# Patient Record
Sex: FEMALE | Race: WHITE | NOT HISPANIC OR LATINO | URBAN - METROPOLITAN AREA
[De-identification: names, ages, dates, MRNs, and addresses within clinical notes are randomized per-mention and may not be internally consistent; named-entity substitution may affect disease eponyms.]

---

## 2017-07-31 ENCOUNTER — RECORDS - HEALTHEAST (OUTPATIENT)
Dept: LAB | Facility: CLINIC | Age: 27
End: 2017-07-31

## 2017-07-31 LAB
CHOLEST SERPL-MCNC: 224 MG/DL
FASTING STATUS PATIENT QL REPORTED: ABNORMAL
HDLC SERPL-MCNC: 53 MG/DL
LDLC SERPL CALC-MCNC: 138 MG/DL
TRIGL SERPL-MCNC: 167 MG/DL

## 2018-02-26 ENCOUNTER — RECORDS - HEALTHEAST (OUTPATIENT)
Dept: LAB | Facility: CLINIC | Age: 28
End: 2018-02-26

## 2018-02-28 LAB — BACTERIA SPEC CULT: NO GROWTH

## 2018-03-26 ENCOUNTER — RECORDS - HEALTHEAST (OUTPATIENT)
Dept: LAB | Facility: CLINIC | Age: 28
End: 2018-03-26

## 2018-03-27 LAB
ANION GAP SERPL CALCULATED.3IONS-SCNC: 12 MMOL/L (ref 5–18)
BUN SERPL-MCNC: 16 MG/DL (ref 8–22)
CALCIUM SERPL-MCNC: 9.8 MG/DL (ref 8.5–10.5)
CHLORIDE BLD-SCNC: 104 MMOL/L (ref 98–107)
CO2 SERPL-SCNC: 24 MMOL/L (ref 22–31)
CREAT SERPL-MCNC: 0.8 MG/DL (ref 0.6–1.1)
GFR SERPL CREATININE-BSD FRML MDRD: >60 ML/MIN/1.73M2
GLUCOSE BLD-MCNC: 84 MG/DL (ref 70–125)
POTASSIUM BLD-SCNC: 4 MMOL/L (ref 3.5–5)
SODIUM SERPL-SCNC: 140 MMOL/L (ref 136–145)
TSH SERPL DL<=0.005 MIU/L-ACNC: 1.03 UIU/ML (ref 0.3–5)

## 2018-03-28 LAB — 25(OH)D3 SERPL-MCNC: 9.9 NG/ML (ref 30–80)

## 2018-10-02 ENCOUNTER — RECORDS - HEALTHEAST (OUTPATIENT)
Dept: LAB | Facility: CLINIC | Age: 28
End: 2018-10-02

## 2018-10-03 LAB
ALBUMIN SERPL-MCNC: 4.3 G/DL (ref 3.5–5)
ALP SERPL-CCNC: 49 U/L (ref 45–120)
ALT SERPL W P-5'-P-CCNC: 12 U/L (ref 0–45)
ANION GAP SERPL CALCULATED.3IONS-SCNC: 11 MMOL/L (ref 5–18)
AST SERPL W P-5'-P-CCNC: 14 U/L (ref 0–40)
BILIRUB SERPL-MCNC: 0.9 MG/DL (ref 0–1)
BUN SERPL-MCNC: 16 MG/DL (ref 8–22)
CALCIUM SERPL-MCNC: 10.1 MG/DL (ref 8.5–10.5)
CHLORIDE BLD-SCNC: 102 MMOL/L (ref 98–107)
CHOLEST SERPL-MCNC: 256 MG/DL
CO2 SERPL-SCNC: 24 MMOL/L (ref 22–31)
CREAT SERPL-MCNC: 0.79 MG/DL (ref 0.6–1.1)
FASTING STATUS PATIENT QL REPORTED: ABNORMAL
GFR SERPL CREATININE-BSD FRML MDRD: >60 ML/MIN/1.73M2
GLUCOSE BLD-MCNC: 77 MG/DL (ref 70–125)
HDLC SERPL-MCNC: 55 MG/DL
LDLC SERPL CALC-MCNC: 162 MG/DL
POTASSIUM BLD-SCNC: 4.5 MMOL/L (ref 3.5–5)
PROT SERPL-MCNC: 8 G/DL (ref 6–8)
SODIUM SERPL-SCNC: 137 MMOL/L (ref 136–145)
TRIGL SERPL-MCNC: 194 MG/DL

## 2018-10-04 LAB — 25(OH)D3 SERPL-MCNC: 42.1 NG/ML (ref 30–80)

## 2019-08-08 ENCOUNTER — COMMUNICATION - HEALTHEAST (OUTPATIENT)
Dept: PALLIATIVE MEDICINE | Facility: OTHER | Age: 29
End: 2019-08-08

## 2019-09-04 ENCOUNTER — RECORDS - HEALTHEAST (OUTPATIENT)
Dept: LAB | Facility: CLINIC | Age: 29
End: 2019-09-04

## 2019-09-06 LAB
BACTERIA SPEC CULT: ABNORMAL
BACTERIA SPEC CULT: ABNORMAL

## 2019-10-07 ENCOUNTER — RECORDS - HEALTHEAST (OUTPATIENT)
Dept: LAB | Facility: CLINIC | Age: 29
End: 2019-10-07

## 2019-10-08 LAB
ANION GAP SERPL CALCULATED.3IONS-SCNC: 13 MMOL/L (ref 5–18)
BUN SERPL-MCNC: 14 MG/DL (ref 8–22)
CALCIUM SERPL-MCNC: 9.9 MG/DL (ref 8.5–10.5)
CHLORIDE BLD-SCNC: 102 MMOL/L (ref 98–107)
CHOLEST SERPL-MCNC: 240 MG/DL
CO2 SERPL-SCNC: 22 MMOL/L (ref 22–31)
CREAT SERPL-MCNC: 0.78 MG/DL (ref 0.6–1.1)
FASTING STATUS PATIENT QL REPORTED: ABNORMAL
GFR SERPL CREATININE-BSD FRML MDRD: >60 ML/MIN/1.73M2
GLUCOSE BLD-MCNC: 83 MG/DL (ref 70–125)
HDLC SERPL-MCNC: 49 MG/DL
LDLC SERPL CALC-MCNC: 140 MG/DL
POTASSIUM BLD-SCNC: 4.5 MMOL/L (ref 3.5–5)
SODIUM SERPL-SCNC: 137 MMOL/L (ref 136–145)
TRIGL SERPL-MCNC: 257 MG/DL

## 2019-10-09 LAB
25(OH)D3 SERPL-MCNC: 45.5 NG/ML (ref 30–80)
BKR LAB AP ABNORMAL BLEEDING: YES
BKR LAB AP BIRTH CONTROL/HORMONES: NORMAL
BKR LAB AP CERVICAL APPEARANCE: NORMAL
BKR LAB AP GYN ADEQUACY: NORMAL
BKR LAB AP GYN INTERPRETATION: NORMAL
BKR LAB AP HPV REFLEX: NORMAL
BKR LAB AP LMP: NORMAL
BKR LAB AP PATIENT STATUS: NORMAL
BKR LAB AP PREVIOUS ABNORMAL: NORMAL
BKR LAB AP PREVIOUS NORMAL: NORMAL
HIGH RISK?: NO
PATH REPORT.COMMENTS IMP SPEC: NORMAL
RESULT FLAG (HE HISTORICAL CONVERSION): NORMAL

## 2019-12-03 ENCOUNTER — RECORDS - HEALTHEAST (OUTPATIENT)
Dept: LAB | Facility: CLINIC | Age: 29
End: 2019-12-03

## 2019-12-03 LAB
CLUE CELLS: NORMAL
HIV 1+2 AB+HIV1 P24 AG SERPL QL IA: NEGATIVE
TRICHOMONAS, WET PREP: NORMAL
YEAST, WET PREP: NORMAL

## 2019-12-04 LAB
BACTERIA SPEC CULT: NO GROWTH
C TRACH DNA SPEC QL PROBE+SIG AMP: NEGATIVE
N GONORRHOEA DNA SPEC QL NAA+PROBE: NEGATIVE
T PALLIDUM AB SER QL: NEGATIVE

## 2021-01-05 ENCOUNTER — RECORDS - HEALTHEAST (OUTPATIENT)
Dept: LAB | Facility: CLINIC | Age: 31
End: 2021-01-05

## 2021-01-05 LAB
ANION GAP SERPL CALCULATED.3IONS-SCNC: 9 MMOL/L (ref 5–18)
BUN SERPL-MCNC: 12 MG/DL (ref 8–22)
CALCIUM SERPL-MCNC: 9.9 MG/DL (ref 8.5–10.5)
CHLORIDE BLD-SCNC: 105 MMOL/L (ref 98–107)
CHOLEST SERPL-MCNC: 281 MG/DL
CO2 SERPL-SCNC: 25 MMOL/L (ref 22–31)
CREAT SERPL-MCNC: 0.8 MG/DL (ref 0.6–1.1)
FASTING STATUS PATIENT QL REPORTED: ABNORMAL
GFR SERPL CREATININE-BSD FRML MDRD: >60 ML/MIN/1.73M2
GLUCOSE BLD-MCNC: 77 MG/DL (ref 70–125)
HDLC SERPL-MCNC: 38 MG/DL
LDLC SERPL CALC-MCNC: 189 MG/DL
POTASSIUM BLD-SCNC: 4.5 MMOL/L (ref 3.5–5)
SODIUM SERPL-SCNC: 139 MMOL/L (ref 136–145)
TRIGL SERPL-MCNC: 268 MG/DL
TSH SERPL DL<=0.005 MIU/L-ACNC: 0.55 UIU/ML (ref 0.3–5)

## 2021-01-06 LAB — 25(OH)D3 SERPL-MCNC: 30.8 NG/ML (ref 30–80)

## 2021-05-31 ENCOUNTER — RECORDS - HEALTHEAST (OUTPATIENT)
Dept: ADMINISTRATIVE | Facility: CLINIC | Age: 31
End: 2021-05-31

## 2022-01-31 PROCEDURE — 87624 HPV HI-RISK TYP POOLED RSLT: CPT | Performed by: PHYSICIAN ASSISTANT

## 2022-01-31 PROCEDURE — 80061 LIPID PANEL: CPT | Performed by: PHYSICIAN ASSISTANT

## 2022-01-31 PROCEDURE — 80048 BASIC METABOLIC PNL TOTAL CA: CPT | Performed by: PHYSICIAN ASSISTANT

## 2022-01-31 PROCEDURE — G0123 SCREEN CERV/VAG THIN LAYER: HCPCS | Performed by: PHYSICIAN ASSISTANT

## 2022-02-01 ENCOUNTER — LAB REQUISITION (OUTPATIENT)
Dept: LAB | Facility: CLINIC | Age: 32
End: 2022-02-01

## 2022-02-01 DIAGNOSIS — E78.49 OTHER HYPERLIPIDEMIA: ICD-10-CM

## 2022-02-01 DIAGNOSIS — L70.0 ACNE VULGARIS: ICD-10-CM

## 2022-02-01 DIAGNOSIS — Z01.419 ENCOUNTER FOR GYNECOLOGICAL EXAMINATION (GENERAL) (ROUTINE) WITHOUT ABNORMAL FINDINGS: ICD-10-CM

## 2022-02-01 LAB
ANION GAP SERPL CALCULATED.3IONS-SCNC: 12 MMOL/L (ref 5–18)
BUN SERPL-MCNC: 12 MG/DL (ref 8–22)
CALCIUM SERPL-MCNC: 9.8 MG/DL (ref 8.5–10.5)
CHLORIDE BLD-SCNC: 102 MMOL/L (ref 98–107)
CHOLEST SERPL-MCNC: 311 MG/DL
CO2 SERPL-SCNC: 24 MMOL/L (ref 22–31)
CREAT SERPL-MCNC: 0.81 MG/DL (ref 0.6–1.1)
FASTING STATUS PATIENT QL REPORTED: ABNORMAL
GFR SERPL CREATININE-BSD FRML MDRD: >90 ML/MIN/1.73M2
GLUCOSE BLD-MCNC: 87 MG/DL (ref 70–125)
HDLC SERPL-MCNC: 40 MG/DL
LDLC SERPL CALC-MCNC: 192 MG/DL
POTASSIUM BLD-SCNC: 4.5 MMOL/L (ref 3.5–5)
SODIUM SERPL-SCNC: 138 MMOL/L (ref 136–145)
TRIGL SERPL-MCNC: 396 MG/DL

## 2022-02-02 LAB
HUMAN PAPILLOMA VIRUS 16 DNA: NEGATIVE
HUMAN PAPILLOMA VIRUS 18 DNA: NEGATIVE
HUMAN PAPILLOMA VIRUS FINAL DIAGNOSIS: NORMAL
HUMAN PAPILLOMA VIRUS OTHER HR: NEGATIVE

## 2022-02-07 LAB
BKR LAB AP GYN ADEQUACY: NORMAL
BKR LAB AP GYN INTERPRETATION: NORMAL
BKR LAB AP HPV REFLEX: NORMAL
BKR LAB AP PREVIOUS ABNORMAL: NORMAL
PATH REPORT.COMMENTS IMP SPEC: NORMAL
PATH REPORT.COMMENTS IMP SPEC: NORMAL
PATH REPORT.RELEVANT HX SPEC: NORMAL

## 2022-12-02 ENCOUNTER — LAB REQUISITION (OUTPATIENT)
Dept: LAB | Facility: CLINIC | Age: 32
End: 2022-12-02

## 2022-12-02 DIAGNOSIS — R39.9 UNSPECIFIED SYMPTOMS AND SIGNS INVOLVING THE GENITOURINARY SYSTEM: ICD-10-CM

## 2022-12-02 PROCEDURE — 87086 URINE CULTURE/COLONY COUNT: CPT | Performed by: PHYSICIAN ASSISTANT

## 2022-12-04 LAB — BACTERIA UR CULT: NORMAL

## 2023-01-30 ENCOUNTER — LAB REQUISITION (OUTPATIENT)
Dept: LAB | Facility: CLINIC | Age: 33
End: 2023-01-30

## 2023-01-30 DIAGNOSIS — J02.9 ACUTE PHARYNGITIS, UNSPECIFIED: ICD-10-CM

## 2023-01-30 PROCEDURE — 87081 CULTURE SCREEN ONLY: CPT | Performed by: PHYSICIAN ASSISTANT

## 2023-02-01 LAB — BACTERIA SPEC CULT: NORMAL

## 2023-02-14 ENCOUNTER — LAB REQUISITION (OUTPATIENT)
Dept: LAB | Facility: CLINIC | Age: 33
End: 2023-02-14

## 2023-02-14 DIAGNOSIS — E78.49 OTHER HYPERLIPIDEMIA: ICD-10-CM

## 2023-02-14 LAB
ALBUMIN SERPL BCG-MCNC: 4.3 G/DL (ref 3.5–5.2)
ALP SERPL-CCNC: 49 U/L (ref 35–104)
ALT SERPL W P-5'-P-CCNC: 18 U/L (ref 10–35)
ANION GAP SERPL CALCULATED.3IONS-SCNC: 12 MMOL/L (ref 7–15)
AST SERPL W P-5'-P-CCNC: 16 U/L (ref 10–35)
BILIRUB SERPL-MCNC: 0.8 MG/DL
BUN SERPL-MCNC: 11.8 MG/DL (ref 6–20)
CALCIUM SERPL-MCNC: 9.7 MG/DL (ref 8.6–10)
CHLORIDE SERPL-SCNC: 103 MMOL/L (ref 98–107)
CHOLEST SERPL-MCNC: 232 MG/DL
CREAT SERPL-MCNC: 0.75 MG/DL (ref 0.51–0.95)
DEPRECATED HCO3 PLAS-SCNC: 25 MMOL/L (ref 22–29)
GFR SERPL CREATININE-BSD FRML MDRD: >90 ML/MIN/1.73M2
GLUCOSE SERPL-MCNC: 90 MG/DL (ref 70–99)
HDLC SERPL-MCNC: 35 MG/DL
LDLC SERPL CALC-MCNC: 142 MG/DL
NONHDLC SERPL-MCNC: 197 MG/DL
POTASSIUM SERPL-SCNC: 4.5 MMOL/L (ref 3.4–5.3)
PROT SERPL-MCNC: 7.2 G/DL (ref 6.4–8.3)
SODIUM SERPL-SCNC: 140 MMOL/L (ref 136–145)
TRIGL SERPL-MCNC: 276 MG/DL

## 2023-02-14 PROCEDURE — 80053 COMPREHEN METABOLIC PANEL: CPT | Performed by: PHYSICIAN ASSISTANT

## 2023-02-14 PROCEDURE — 80061 LIPID PANEL: CPT | Performed by: PHYSICIAN ASSISTANT

## 2023-05-16 ENCOUNTER — LAB REQUISITION (OUTPATIENT)
Dept: LAB | Facility: CLINIC | Age: 33
End: 2023-05-16

## 2023-05-16 DIAGNOSIS — R39.9 UNSPECIFIED SYMPTOMS AND SIGNS INVOLVING THE GENITOURINARY SYSTEM: ICD-10-CM

## 2023-05-16 DIAGNOSIS — N93.0 POSTCOITAL AND CONTACT BLEEDING: ICD-10-CM

## 2023-05-16 LAB
NUGENT SCORE: 0
WHITE BLOOD CELLS: NORMAL

## 2023-05-16 PROCEDURE — 87591 N.GONORRHOEAE DNA AMP PROB: CPT | Performed by: PHYSICIAN ASSISTANT

## 2023-05-16 PROCEDURE — 87186 SC STD MICRODIL/AGAR DIL: CPT | Performed by: PHYSICIAN ASSISTANT

## 2023-05-16 PROCEDURE — 87491 CHLMYD TRACH DNA AMP PROBE: CPT | Performed by: PHYSICIAN ASSISTANT

## 2023-05-16 PROCEDURE — 87205 SMEAR GRAM STAIN: CPT | Performed by: PHYSICIAN ASSISTANT

## 2023-05-17 LAB
C TRACH DNA SPEC QL PROBE+SIG AMP: NEGATIVE
N GONORRHOEA DNA SPEC QL NAA+PROBE: NEGATIVE

## 2023-05-18 LAB — BACTERIA UR CULT: ABNORMAL

## 2023-10-23 ENCOUNTER — LAB REQUISITION (OUTPATIENT)
Dept: LAB | Facility: CLINIC | Age: 33
End: 2023-10-23

## 2023-10-23 DIAGNOSIS — R39.9 UNSPECIFIED SYMPTOMS AND SIGNS INVOLVING THE GENITOURINARY SYSTEM: ICD-10-CM

## 2023-10-23 PROCEDURE — 87086 URINE CULTURE/COLONY COUNT: CPT | Performed by: PHYSICIAN ASSISTANT

## 2023-10-24 LAB — BACTERIA UR CULT: NORMAL

## 2023-11-02 ENCOUNTER — LAB REQUISITION (OUTPATIENT)
Dept: LAB | Facility: CLINIC | Age: 33
End: 2023-11-02

## 2023-11-02 DIAGNOSIS — R39.9 UNSPECIFIED SYMPTOMS AND SIGNS INVOLVING THE GENITOURINARY SYSTEM: ICD-10-CM

## 2023-11-02 LAB
ALBUMIN UR-MCNC: 10 MG/DL
APPEARANCE UR: ABNORMAL
BILIRUB UR QL STRIP: NEGATIVE
COLOR UR AUTO: ABNORMAL
GLUCOSE UR STRIP-MCNC: NEGATIVE MG/DL
HGB UR QL STRIP: NEGATIVE
KETONES UR STRIP-MCNC: NEGATIVE MG/DL
LEUKOCYTE ESTERASE UR QL STRIP: ABNORMAL
MUCOUS THREADS #/AREA URNS LPF: PRESENT /LPF
NITRATE UR QL: NEGATIVE
PH UR STRIP: 5 [PH] (ref 5–7)
RBC URINE: 0 /HPF
SP GR UR STRIP: 1.02 (ref 1–1.03)
SQUAMOUS EPITHELIAL: 4 /HPF
UROBILINOGEN UR STRIP-MCNC: NORMAL MG/DL
WBC URINE: 12 /HPF

## 2023-11-02 PROCEDURE — 87086 URINE CULTURE/COLONY COUNT: CPT | Performed by: PHYSICIAN ASSISTANT

## 2023-11-02 PROCEDURE — 81001 URINALYSIS AUTO W/SCOPE: CPT | Performed by: PHYSICIAN ASSISTANT

## 2023-11-04 LAB — BACTERIA UR CULT: NORMAL

## 2024-02-27 ENCOUNTER — LAB REQUISITION (OUTPATIENT)
Dept: LAB | Facility: CLINIC | Age: 34
End: 2024-02-27

## 2024-02-27 DIAGNOSIS — E55.9 VITAMIN D DEFICIENCY, UNSPECIFIED: ICD-10-CM

## 2024-02-27 DIAGNOSIS — G25.81 RESTLESS LEGS SYNDROME: ICD-10-CM

## 2024-02-27 DIAGNOSIS — F32.1 MAJOR DEPRESSIVE DISORDER, SINGLE EPISODE, MODERATE (H): ICD-10-CM

## 2024-02-27 PROCEDURE — 84443 ASSAY THYROID STIM HORMONE: CPT | Performed by: PHYSICIAN ASSISTANT

## 2024-02-27 PROCEDURE — 83550 IRON BINDING TEST: CPT | Performed by: PHYSICIAN ASSISTANT

## 2024-02-27 PROCEDURE — 82306 VITAMIN D 25 HYDROXY: CPT | Performed by: PHYSICIAN ASSISTANT

## 2024-02-27 PROCEDURE — 82607 VITAMIN B-12: CPT | Performed by: PHYSICIAN ASSISTANT

## 2024-02-27 PROCEDURE — 82374 ASSAY BLOOD CARBON DIOXIDE: CPT | Performed by: PHYSICIAN ASSISTANT

## 2024-02-27 PROCEDURE — 82728 ASSAY OF FERRITIN: CPT | Performed by: PHYSICIAN ASSISTANT

## 2024-02-27 PROCEDURE — 84155 ASSAY OF PROTEIN SERUM: CPT | Performed by: PHYSICIAN ASSISTANT

## 2024-02-28 LAB
ALBUMIN SERPL BCG-MCNC: 4.7 G/DL (ref 3.5–5.2)
ALP SERPL-CCNC: 47 U/L (ref 40–150)
ALT SERPL W P-5'-P-CCNC: 15 U/L (ref 0–50)
ANION GAP SERPL CALCULATED.3IONS-SCNC: 13 MMOL/L (ref 7–15)
AST SERPL W P-5'-P-CCNC: 15 U/L (ref 0–45)
BILIRUB SERPL-MCNC: 0.7 MG/DL
BUN SERPL-MCNC: 11.6 MG/DL (ref 6–20)
CALCIUM SERPL-MCNC: 9.9 MG/DL (ref 8.6–10)
CHLORIDE SERPL-SCNC: 102 MMOL/L (ref 98–107)
CREAT SERPL-MCNC: 0.69 MG/DL (ref 0.51–0.95)
DEPRECATED HCO3 PLAS-SCNC: 23 MMOL/L (ref 22–29)
EGFRCR SERPLBLD CKD-EPI 2021: >90 ML/MIN/1.73M2
FERRITIN SERPL-MCNC: 31 NG/ML (ref 6–175)
GLUCOSE SERPL-MCNC: 96 MG/DL (ref 70–99)
IRON BINDING CAPACITY (ROCHE): 341 UG/DL (ref 240–430)
IRON SATN MFR SERPL: 27 % (ref 15–46)
IRON SERPL-MCNC: 91 UG/DL (ref 37–145)
POTASSIUM SERPL-SCNC: 4.3 MMOL/L (ref 3.4–5.3)
PROT SERPL-MCNC: 7.7 G/DL (ref 6.4–8.3)
SODIUM SERPL-SCNC: 138 MMOL/L (ref 135–145)
TSH SERPL DL<=0.005 MIU/L-ACNC: 1.62 UIU/ML (ref 0.3–4.2)
VIT B12 SERPL-MCNC: 188 PG/ML (ref 232–1245)
VIT D+METAB SERPL-MCNC: 19 NG/ML (ref 20–50)

## 2024-04-02 ENCOUNTER — OFFICE VISIT (OUTPATIENT)
Dept: PHARMACY | Facility: PHYSICIAN GROUP | Age: 34
End: 2024-04-02
Payer: COMMERCIAL

## 2024-04-02 DIAGNOSIS — F41.9 ANXIETY: Primary | ICD-10-CM

## 2024-04-02 DIAGNOSIS — F32.A DEPRESSION, UNSPECIFIED DEPRESSION TYPE: ICD-10-CM

## 2024-04-02 PROCEDURE — 99605 MTMS BY PHARM NP 15 MIN: CPT | Performed by: PHARMACIST

## 2024-04-02 PROCEDURE — 99607 MTMS BY PHARM ADDL 15 MIN: CPT | Performed by: PHARMACIST

## 2024-04-02 RX ORDER — LORAZEPAM 0.5 MG/1
.5-1 TABLET ORAL EVERY 12 HOURS PRN
COMMUNITY

## 2024-04-02 RX ORDER — METHYLPHENIDATE HYDROCHLORIDE 18 MG/1
18 TABLET ORAL EVERY MORNING
COMMUNITY

## 2024-04-02 NOTE — PROGRESS NOTES
Medication Therapy Management (MTM) Encounter    ASSESSMENT:                            Medication Adherence/Access: No issues identified    Depression/Anxiety:   One Ome testing could be a good place to start to pick a medication that she metabolizes normally.     PLAN:                            1. Education Provided:  - Potential impact of pharmacokinetic and pharmacodynamic genetic variation on medication metabolism and action  - Reviewed genes tested  - Reviewed what report will look like  - How information may be helpful in guiding treatment  - How results may be useful when reviewing safety of other medications  - Limitations of test results on individual medication experience and other factors that impact medication selection    2. Patient Consent Form reviewed with patient, patient provided opportunity to ask questions, and confirmed understanding.    3. Test was ordered.    4. Will plan to review test results and weigh pro/cons of medications to help decide a good starting place for anxiety/depression treatment.       Follow-up: Return in 4 weeks (on 4/30/2024) for in person.    SUBJECTIVE/OBJECTIVE:                          Omayra Damian is a 33 year old female coming in for an initial visit. She was referred to me from Luis Oreilly.      Reason for visit: OneOme discussion. Struggling with depression and anxiety    Allergies/ADRs: Reviewed in chart  Past Medical History: Reviewed in chart  Tobacco: She reports that she has never smoked. She does not have any smokeless tobacco history on file.  Alcohol: rare  Other Substance Use: Marijuana - varies    Medication Adherence/Access: no issues reported    Depression/Anxiety:  Lorazepam 0.5mg 1-2 tablets twice daily PRN  Was really inconsistent with sertraline, stopped it all together. Doesn't think she was ever consistent taking it  Feels her anxiety is worse than depression at this time - constantly hyper vigilante, waiting for the next bad thing to  "happen. Wants to get to root of problem, be present. But also feeling really overwhelmed.  Not opposed to medications, but also wants more information and to know she is doing things \"right\" for her.    From 2/27 visit with primary care provider:   Has a long standing history of mental health symptoms/concerns. Has taken medication intermittently and has worked with a therapist for years. When last seen, therapist had recommended IOP program due to worsening depression. Was experiencing suicidal ideations and lack of motivation. Was staying with parents due to severity of symptoms. Sertraline dose subsequently increased, and she was advised to start taking the Concerta on a regular basis as well. Did an outpatient program through [facility] for 6 weeks but did not find it helpful. Tapered off the sertraline because she was not certain that it was helping. Was seeing therapist regularly, but she has not there in a few weeks because of how the last appointment went. Depression and anxiety symptoms continue to be severe. Was given prescription for mirtazapine by psychiatry but did not start taking due to concerns about weight gain. Working at  [employer] now. Requesting lab work today. She continues to have suicidal ideations but denies plan. Staying in her own place now. Has noticed some restless legs now too.      Today's Vitals: There were no vitals taken for this visit.  ----------------      I spent 45 minutes with this patient today. All changes were made via collaborative practice agreement with Luis Oreilly PA-C. A copy of the visit note was provided to the patient's provider(s).    A summary of these recommendations was sent via clinic portal.    Juani Donovan, Pharm.D., [facility]  Medication Therapy Management Pharmacist  [phone]          Medication Therapy Recommendations  Depression, unspecified depression type    Rationale: Untreated condition - Needs additional medication therapy - " Indication   Recommendation: Order Lab   Status: Accepted per CPA

## 2024-04-02 NOTE — PATIENT INSTRUCTIONS
Recommendations from today's MTM visit:                                                      1. Education Provided:  - Potential impact of pharmacokinetic and pharmacodynamic genetic variation on medication metabolism and action  - Reviewed genes tested  - Reviewed what report will look like  - How information may be helpful in guiding treatment  - How results may be useful when reviewing safety of other medications  - Limitations of test results on individual medication experience and other factors that impact medication selection    2. Patient Consent Form reviewed with patient, patient provided opportunity to ask questions, and confirmed understanding.    3. Test was ordered.    4. Will plan to review test results and weigh pro/cons of medications to help decide a good starting place for anxiety/depression treatment.       Follow-up: Return in 4 weeks (on 4/30/2024) for in person.    It was great to speak with you today.  I value your experience and would be very thankful for your time with providing feedback on our clinic survey. You may receive a survey via email or text message in the next few days.     To schedule another MTM appointment, please call the clinic directly or you may call the scheduling line at [phone].    My Clinical Pharmacist's contact information:                                                      Please feel free to contact me with any questions or concerns you have.      Juani Donovan, Pharm.D., [facility]  Medication Therapy Management Pharmacist  [phone]

## 2024-04-26 ENCOUNTER — LAB REQUISITION (OUTPATIENT)
Dept: LAB | Facility: CLINIC | Age: 34
End: 2024-04-26

## 2024-04-26 DIAGNOSIS — R39.9 UNSPECIFIED SYMPTOMS AND SIGNS INVOLVING THE GENITOURINARY SYSTEM: ICD-10-CM

## 2024-04-26 PROCEDURE — 87086 URINE CULTURE/COLONY COUNT: CPT | Performed by: PHYSICIAN ASSISTANT

## 2024-04-28 LAB — BACTERIA UR CULT: NORMAL

## 2024-04-30 ENCOUNTER — OFFICE VISIT (OUTPATIENT)
Dept: PHARMACY | Facility: PHYSICIAN GROUP | Age: 34
End: 2024-04-30
Payer: COMMERCIAL

## 2024-04-30 ENCOUNTER — LAB REQUISITION (OUTPATIENT)
Dept: LAB | Facility: CLINIC | Age: 34
End: 2024-04-30

## 2024-04-30 VITALS — SYSTOLIC BLOOD PRESSURE: 110 MMHG | DIASTOLIC BLOOD PRESSURE: 60 MMHG | WEIGHT: 206 LBS

## 2024-04-30 DIAGNOSIS — F41.9 ANXIETY: Primary | ICD-10-CM

## 2024-04-30 DIAGNOSIS — F32.A DEPRESSION, UNSPECIFIED DEPRESSION TYPE: ICD-10-CM

## 2024-04-30 DIAGNOSIS — E55.9 VITAMIN D DEFICIENCY, UNSPECIFIED: ICD-10-CM

## 2024-04-30 PROCEDURE — 99606 MTMS BY PHARM EST 15 MIN: CPT | Performed by: PHARMACIST

## 2024-04-30 PROCEDURE — 82306 VITAMIN D 25 HYDROXY: CPT | Performed by: PHYSICIAN ASSISTANT

## 2024-04-30 PROCEDURE — 99607 MTMS BY PHARM ADDL 15 MIN: CPT | Performed by: PHARMACIST

## 2024-04-30 PROCEDURE — 82607 VITAMIN B-12: CPT | Performed by: PHYSICIAN ASSISTANT

## 2024-04-30 RX ORDER — FLUOXETINE 10 MG/1
10 CAPSULE ORAL DAILY
COMMUNITY

## 2024-04-30 NOTE — PATIENT INSTRUCTIONS
Recommendations from today's MTM visit:                                                      1. Start fluoxetine 10mg one capsule by mouth every morning. You may notice some impacts to mood and anxiety right away, but it can take 6-8 weeks to realize the full benefits. You may experience some GI upset, headache, and increased anxiety initially, but this usually improves over a few weeks.    2. We reviewed your OneOme test results in detail. As more information is learned about pharmacogenetics, more gene tests may become actionable and will be moved into your medical record.  Since this information is growing and changing rapidly, we recommend that you contact us annually to see if any new information is available for you. T    Gene   (part of your DNA was tested) Metabolizer Status (how the protein is working in your body) Meaning   (what the protein is doing in your body to medications) Medications   (that are metabolized, or processed, by the protein)   CYP2B6 Normal metabolizer Based on the genetic test, you are predicted to be a CYP2B6 normal metabolizer. This means your body is expected to eliminate, or get rid of medications normally.  Efavirenz, sertraline   CNK4X93 Intermediate metabolizer Based on the genetic test, you are predicted to be a POK5E08 intermediate metabolizer. This means your body may be slower to eliminate, or get rid of, medications that are processed by UJX6Q11 and you may be at increased risk of experiencing side effects. Some medications have to be turned on by WXA9K69 and as an intermediate metabolizer, you may be at risk of these medications not working for you.  Sertraline, escitalopram, citalopram, proton pump inhibitors such as omeprazole, voriconazole, clopidogrel and some of the tricylic antidepressants    CYP2C9 Normal metabolizer Based on the genetic test, you are predicted to be a CYP2C9 normal metabolizer. This means your body is expected to eliminate, or get rid of  medications normally.  Celecoxib, ibuprofen, meloxicam, piroxicam, flurbiprofen, fluvastatin, fosphenytoin, phenytoin, siponimod, warfarin    CYP2D6 Normal metabolizer Based on the genetic test, you are predicted to be a CYP2D6 normal metabolizer. This means your body is expected to eliminate, or get rid of medications normally.  Codeine, tramadol, fluvoxamine, paroxetine, venlafaxine, vortioxetine, atomoxetine, ondansetron, and the tricylic antidepressants    CY Poor metabolizer Based on the genetic test, you are predicted to be a CY poor metabolizer. Most of the population are CY poor metabolizers and normal doses of medications are based off of CY poor metabolizers. This means your body is expected to eliminate, or get rid of medications normally.  Tacrolimus    DPYD Intermediate metabolizer Based on the genetic test, you are predicted to be a DPYD intermediate metabolizer. This means your body may be slower to eliminate, or get rid of, medications that are processed by DPYD and you may be at increased risk of experiencing side effects.  Fluorouracil, capecitabine   WQJC8D0 Decreased function Based on the genetic test, you are predicted to have decreased function of MKRC9F5. This means your body may not be able to eliminate, or get rid of, medications that are transported by WICQ0R2 to the liver and you may be at increased risk of experiencing side effects.  Statins   TPMT Normal metabolizer Based on the genetic test, you are predicted to be a TPMT normal metabolizer. This means your body is expected to eliminate, or get rid of medications normally.  Azathioprine, mercaptopurine, thioguanine   UGT1A1  Poor metabolizer Based on the genetic test, you are predicted to be a UGT1A1 poor metabolizer. This means your body may not be able to eliminate, or get rid of, medications that are processed by UGT1A1 and you may be at increased risk of experiencing side effects.  Atazanavir, irinotecan, belinostat        Follow-up: Return in 4 weeks (on 5/28/2024) for Medication dose check, by phone.    It was great to speak with you today.  I value your experience and would be very thankful for your time with providing feedback on our clinic survey. You may receive a survey via email or text message in the next few days.     To schedule another MTM appointment, please call the clinic directly or you may call the scheduling line at [phone].    My Clinical Pharmacist's contact information:                                                      Please feel free to contact me with any questions or concerns you have.      Juani Donovan, Pharm.D., [facility]  Medication Therapy Management Pharmacist  [phone]

## 2024-04-30 NOTE — PROGRESS NOTES
Medication Therapy Management (MTM) Encounter    ASSESSMENT:                            Medication Adherence/Access: No issues identified    Depression/Anxiety:   Needs improvement. She would likely benefit from initiation of drug therapy at this time in combination with other non-pharmacologic cares she is already doing.      PLAN:                            1. Start fluoxetine 10mg one capsule by mouth every morning. You may notice some impacts to mood and anxiety right away, but it can take 6-8 weeks to realize the full benefits. You may experience some GI upset, headache, and increased anxiety initially, but this usually improves over a few weeks.    2. We reviewed your OneOme test results in detail. As more information is learned about pharmacogenetics, more gene tests may become actionable and will be moved into your medical record.  Since this information is growing and changing rapidly, we recommend that you contact us annually to see if any new information is available for you. T    Gene   (part of your DNA was tested) Metabolizer Status (how the protein is working in your body) Meaning   (what the protein is doing in your body to medications) Medications   (that are metabolized, or processed, by the protein)   CYP2B6 Normal metabolizer Based on the genetic test, you are predicted to be a CYP2B6 normal metabolizer. This means your body is expected to eliminate, or get rid of medications normally.  Efavirenz, sertraline   YHO3Z43 Intermediate metabolizer Based on the genetic test, you are predicted to be a TKQ1V77 intermediate metabolizer. This means your body may be slower to eliminate, or get rid of, medications that are processed by NSH1B95 and you may be at increased risk of experiencing side effects. Some medications have to be turned on by HPM4A19 and as an intermediate metabolizer, you may be at risk of these medications not working for you.  Sertraline, escitalopram, citalopram, proton pump  inhibitors such as omeprazole, voriconazole, clopidogrel and some of the tricylic antidepressants    CYP2C9 Normal metabolizer Based on the genetic test, you are predicted to be a CYP2C9 normal metabolizer. This means your body is expected to eliminate, or get rid of medications normally.  Celecoxib, ibuprofen, meloxicam, piroxicam, flurbiprofen, fluvastatin, fosphenytoin, phenytoin, siponimod, warfarin    CYP2D6 Normal metabolizer Based on the genetic test, you are predicted to be a CYP2D6 normal metabolizer. This means your body is expected to eliminate, or get rid of medications normally.  Codeine, tramadol, fluvoxamine, paroxetine, venlafaxine, vortioxetine, atomoxetine, ondansetron, and the tricylic antidepressants    CY Poor metabolizer Based on the genetic test, you are predicted to be a CY poor metabolizer. Most of the population are CY poor metabolizers and normal doses of medications are based off of CY poor metabolizers. This means your body is expected to eliminate, or get rid of medications normally.  Tacrolimus    DPYD Intermediate metabolizer Based on the genetic test, you are predicted to be a DPYD intermediate metabolizer. This means your body may be slower to eliminate, or get rid of, medications that are processed by DPYD and you may be at increased risk of experiencing side effects.  Fluorouracil, capecitabine   TLSE4V3 Decreased function Based on the genetic test, you are predicted to have decreased function of SWHL1W8. This means your body may not be able to eliminate, or get rid of, medications that are transported by UTGF4S9 to the liver and you may be at increased risk of experiencing side effects.  Statins   TPMT Normal metabolizer Based on the genetic test, you are predicted to be a TPMT normal metabolizer. This means your body is expected to eliminate, or get rid of medications normally.  Azathioprine, mercaptopurine, thioguanine   UGT1A1  Poor metabolizer Based on the  "genetic test, you are predicted to be a UGT1A1 poor metabolizer. This means your body may not be able to eliminate, or get rid of, medications that are processed by UGT1A1 and you may be at increased risk of experiencing side effects.  Atazanavir, irinotecan, belinostat       Follow-up: Return in 4 weeks (on 5/28/2024) for Medication dose check, by phone.      SUBJECTIVE/OBJECTIVE:                          Omayra Damian is a 33 year old female coming in for a follow-up visit.      Reason for visit: Medication Review.    Allergies/ADRs: Reviewed in chart  Past Medical History: Reviewed in chart  Tobacco: She reports that she has never smoked. She does not have any smokeless tobacco history on file.  Alcohol: Social History    Substance and Sexual Activity      Alcohol use: Not on file       Medication Adherence/Access: no issues reported    Depression/Anxiety:  Lorazepam 0.5mg 1-2 tablets twice daily PRN  Was really inconsistent with sertraline, stopped it all together. Doesn't think she was ever consistent taking it  Feels her anxiety is worse than depression at this time - constantly hyper vigilant, waiting for the next bad thing to happen. Wants to get to root of problem, be present. But also feeling really overwhelmed.  Not opposed to medications, but also wants more information and to know she is doing things \"right\" for her. Feels ready to accept anything that will help her at this time -- medication, healthy diet, exercise, etc.    Today's Vitals: /60   Wt 206 lb (93.4 kg)   ----------------      I spent 60 minutes with this patient today. All changes were made via collaborative practice agreement with Luis Oreilly PA-C. A copy of the visit note was provided to the patient's provider(s).    A summary of these recommendations was declined by the patient.    Juani Donovan, Pharm.D., [facility]  Medication Therapy Management Pharmacist  [phone]          Medication Therapy " Recommendations  Depression, unspecified depression type    Current Medication: FLUoxetine (PROZAC) 10 MG capsule   Rationale: Untreated condition - Needs additional medication therapy - Indication   Recommendation: Start Medication   Status: Accepted per CPA

## 2024-05-01 LAB
VIT B12 SERPL-MCNC: 721 PG/ML (ref 232–1245)
VIT D+METAB SERPL-MCNC: 22 NG/ML (ref 20–50)

## 2024-05-24 NOTE — PROGRESS NOTES
Medication Therapy Management (MTM) Encounter    ASSESSMENT/PLAN:                            Medication Adherence/Access: No issues identified    1. Anxiety and Depression  Improving since starting fluoxetine. Will plan to stay at current dose for a full 8 weeks before deciding if adjustment is necessary.      Follow-up: Return in 4 weeks (on 6/25/2024) for Medication dose check, by phone.      SUBJECTIVE/OBJECTIVE:                          Omayra Damian is a 33 year old female seen for a follow-up visit.      Reason for visit: Medication Review.    Allergies/ADRs: Reviewed in chart  Past Medical History: Reviewed in chart  Tobacco: She reports that she has never smoked. She does not have any smokeless tobacco history on file.  Alcohol: Social History    Substance and Sexual Activity      Alcohol use: Not on file       Medication Adherence/Access: no issues reported - has not missed any doses of fluoxetine    Mental Health   Anxiety and Depression  Fluoxetine 10mg QAM - started last visit  Lorazepam 0.5mg 1-2 tablets twice daily PRN    Patient reports no current medication side effects, possibly reduced libido, but not totally sure.  Current symptoms include: improved.  Patient reports symptoms have gradually improved and feels good right now. Feels like she hasn't been as irritable, and easier to let things go. We did discuss how it can take a full 6-8 weeks to feel the full impact, and she decided she would like to stay at this dose longer before considering increase.  Therapies tried and response: Sertraline - was not consistent with use  Had OneOme testing done April 2024, which showed she has reduced metabolism of sertraline.       Today's Vitals: There were no vitals taken for this visit.  ----------------      I spent 12 minutes with this patient today. All changes were made via collaborative practice agreement with Luis Oreilly PA-C. A copy of the visit note was provided to the patient's  provider(s).    A summary of these recommendations was declined by the patient.    Juani Donovan, Pharm.D., [facility]  Medication Therapy Management Pharmacist  [phone]     Telemedicine Visit Details  Type of service:  Telephone visit  Start Time: 1:31 PM  End Time: 1:43 PM     Medication Therapy Recommendations  No medication therapy recommendations to display

## 2024-05-28 ENCOUNTER — VIRTUAL VISIT (OUTPATIENT)
Dept: PHARMACY | Facility: PHYSICIAN GROUP | Age: 34
End: 2024-05-28
Payer: COMMERCIAL

## 2024-05-28 DIAGNOSIS — F32.A ANXIETY AND DEPRESSION: Primary | ICD-10-CM

## 2024-05-28 DIAGNOSIS — F41.9 ANXIETY AND DEPRESSION: Primary | ICD-10-CM

## 2024-05-28 PROCEDURE — 99606 MTMS BY PHARM EST 15 MIN: CPT | Mod: 93 | Performed by: PHARMACIST

## 2024-05-28 NOTE — PATIENT INSTRUCTIONS
Recommendations from today's MTM visit:                                                      1. Anxiety and Depression  Improving since starting fluoxetine. Will plan to stay at current dose for a full 8 weeks before deciding if adjustment is necessary.      Follow-up: Return in 4 weeks (on 6/25/2024) for Medication dose check, by phone.      It was great to speak with you today.  I value your experience and would be very thankful for your time with providing feedback on our clinic survey. You may receive a survey via email or text message in the next few days.     To schedule another MTM appointment, please call the clinic directly or you may call the scheduling line at [phone].    My Clinical Pharmacist's contact information:                                                      Please feel free to contact me with any questions or concerns you have.      Juani Donovan, Pharm.D., [facility]  Medication Therapy Management Pharmacist  [phone]

## 2024-09-03 ENCOUNTER — LAB REQUISITION (OUTPATIENT)
Dept: LAB | Facility: CLINIC | Age: 34
End: 2024-09-03

## 2024-09-03 DIAGNOSIS — Z11.3 ENCOUNTER FOR SCREENING FOR INFECTIONS WITH A PREDOMINANTLY SEXUAL MODE OF TRANSMISSION: ICD-10-CM

## 2024-09-03 DIAGNOSIS — E78.49 OTHER HYPERLIPIDEMIA: ICD-10-CM

## 2024-09-03 PROCEDURE — 86780 TREPONEMA PALLIDUM: CPT | Performed by: PHYSICIAN ASSISTANT

## 2024-09-03 PROCEDURE — 87389 HIV-1 AG W/HIV-1&-2 AB AG IA: CPT | Performed by: PHYSICIAN ASSISTANT

## 2024-09-03 PROCEDURE — 80061 LIPID PANEL: CPT | Performed by: PHYSICIAN ASSISTANT

## 2024-09-03 PROCEDURE — 87491 CHLMYD TRACH DNA AMP PROBE: CPT | Performed by: PHYSICIAN ASSISTANT

## 2024-09-04 LAB
C TRACH DNA SPEC QL PROBE+SIG AMP: NEGATIVE
CHOLEST SERPL-MCNC: 270 MG/DL
FASTING STATUS PATIENT QL REPORTED: ABNORMAL
HDLC SERPL-MCNC: 46 MG/DL
HIV 1+2 AB+HIV1 P24 AG SERPL QL IA: NONREACTIVE
LDLC SERPL CALC-MCNC: 182 MG/DL
N GONORRHOEA DNA SPEC QL NAA+PROBE: NEGATIVE
NONHDLC SERPL-MCNC: 224 MG/DL
T PALLIDUM AB SER QL: NONREACTIVE
TRIGL SERPL-MCNC: 210 MG/DL